# Patient Record
Sex: FEMALE | Race: WHITE | NOT HISPANIC OR LATINO | Employment: STUDENT | URBAN - METROPOLITAN AREA
[De-identification: names, ages, dates, MRNs, and addresses within clinical notes are randomized per-mention and may not be internally consistent; named-entity substitution may affect disease eponyms.]

---
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
## 2025-03-05 NOTE — TELEPHONE ENCOUNTER
Done, mom can call to schedule  
Please advise, thank you!  
Please find out from mom if she has seen cardiology. If not I would like her to see them since this is a little different from previous.  Sounds like it is a combination of sleep deprivation and anxiety but lets check to be sure.   
Pt has not yet seen cardiology. Would you like to place a referral and I can let mom know when to schedule?  
done